# Patient Record
Sex: FEMALE
[De-identification: names, ages, dates, MRNs, and addresses within clinical notes are randomized per-mention and may not be internally consistent; named-entity substitution may affect disease eponyms.]

---

## 2018-04-24 ENCOUNTER — HOSPITAL ENCOUNTER (EMERGENCY)
Dept: HOSPITAL 14 - H.ER | Age: 43
Discharge: HOME | End: 2018-04-24
Payer: MEDICAID

## 2018-04-24 VITALS — TEMPERATURE: 97 F | DIASTOLIC BLOOD PRESSURE: 76 MMHG | SYSTOLIC BLOOD PRESSURE: 127 MMHG | OXYGEN SATURATION: 98 %

## 2018-04-24 VITALS — RESPIRATION RATE: 18 BRPM | HEART RATE: 78 BPM

## 2018-04-24 DIAGNOSIS — K59.00: Primary | ICD-10-CM

## 2018-04-24 DIAGNOSIS — F31.9: ICD-10-CM

## 2018-04-24 NOTE — RAD
HISTORY:

constipation  



COMPARISON:

11/30/2009.



FINDINGS:



BOWEL:

There is moderate amount of stool in the colon. There is gaseous 

distension of bowel loop in the left mid abdomen. 



BONES:

Normal.



OTHER FINDINGS:

None.



IMPRESSION:

Constipation and gaseous distension of bowel in the left mid abdomen.

## 2018-05-31 ENCOUNTER — HOSPITAL ENCOUNTER (EMERGENCY)
Dept: HOSPITAL 31 - C.ER | Age: 43
Discharge: HOME | End: 2018-05-31
Payer: MEDICAID

## 2018-05-31 VITALS — RESPIRATION RATE: 18 BRPM | OXYGEN SATURATION: 100 %

## 2018-05-31 VITALS — TEMPERATURE: 97.9 F | DIASTOLIC BLOOD PRESSURE: 76 MMHG | SYSTOLIC BLOOD PRESSURE: 111 MMHG | HEART RATE: 54 BPM

## 2018-05-31 DIAGNOSIS — S80.10XA: Primary | ICD-10-CM
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
## 2018-05-31 LAB
BILIRUB UR-MCNC: NEGATIVE MG/DL
GLUCOSE UR STRIP-MCNC: NORMAL MG/DL
HCG,QUALITATIVE URINE: NEGATIVE
LEUKOCYTE ESTERASE UR-ACNC: (no result) LEU/UL
PH UR STRIP: 7 [PH] (ref 5–8)
PROT UR STRIP-MCNC: NEGATIVE MG/DL
RBC # UR STRIP: (no result) /UL
SP GR UR STRIP: 1.01 (ref 1–1.03)
SQUAMOUS EPITHIAL: 5 /HPF (ref 0–5)
UROBILINOGEN UR-MCNC: NORMAL MG/DL (ref 0.2–1)
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
